# Patient Record
Sex: MALE | Race: WHITE | Employment: OTHER | URBAN - METROPOLITAN AREA
[De-identification: names, ages, dates, MRNs, and addresses within clinical notes are randomized per-mention and may not be internally consistent; named-entity substitution may affect disease eponyms.]

---

## 2017-09-22 PROBLEM — J44.9 COPD (CHRONIC OBSTRUCTIVE PULMONARY DISEASE) (HCC): Status: ACTIVE | Noted: 2017-09-22

## 2017-09-22 PROBLEM — I48.91 A-FIB (HCC): Status: ACTIVE | Noted: 2017-09-22

## 2017-09-22 PROBLEM — R07.9 CHEST PAIN: Status: ACTIVE | Noted: 2017-09-22

## 2017-09-22 PROBLEM — I10 HTN (HYPERTENSION): Status: ACTIVE | Noted: 2017-09-22

## 2019-11-08 ENCOUNTER — HOSPITAL ENCOUNTER (EMERGENCY)
Age: 59
Discharge: HOME | End: 2019-11-08
Attending: EMERGENCY MEDICINE
Payer: COMMERCIAL

## 2019-11-08 ENCOUNTER — APPOINTMENT (OUTPATIENT)
Dept: CT IMAGING | Age: 59
End: 2019-11-08
Payer: COMMERCIAL

## 2019-11-08 ENCOUNTER — APPOINTMENT (OUTPATIENT)
Dept: GENERAL RADIOLOGY | Age: 59
End: 2019-11-08
Payer: COMMERCIAL

## 2019-11-08 VITALS
HEART RATE: 77 BPM | SYSTOLIC BLOOD PRESSURE: 125 MMHG | OXYGEN SATURATION: 94 % | WEIGHT: 226 LBS | DIASTOLIC BLOOD PRESSURE: 84 MMHG | BODY MASS INDEX: 30.65 KG/M2 | TEMPERATURE: 98.9 F | RESPIRATION RATE: 26 BRPM

## 2019-11-08 DIAGNOSIS — I95.1 ORTHOSTATIC HYPOTENSION: ICD-10-CM

## 2019-11-08 DIAGNOSIS — R55 SYNCOPE AND COLLAPSE: Primary | ICD-10-CM

## 2019-11-08 LAB
A/G RATIO: 1.3 (ref 1.1–2.2)
ALBUMIN SERPL-MCNC: 3.8 G/DL (ref 3.4–5)
ALP BLD-CCNC: 40 U/L (ref 40–129)
ALT SERPL-CCNC: 19 U/L (ref 10–40)
ANION GAP SERPL CALCULATED.3IONS-SCNC: 10 MMOL/L (ref 3–16)
AST SERPL-CCNC: 24 U/L (ref 15–37)
BASOPHILS ABSOLUTE: 0.1 K/UL (ref 0–0.2)
BASOPHILS RELATIVE PERCENT: 1.2 %
BILIRUB SERPL-MCNC: 0.6 MG/DL (ref 0–1)
BILIRUBIN URINE: NEGATIVE
BLOOD, URINE: NEGATIVE
BUN BLDV-MCNC: 15 MG/DL (ref 7–20)
CALCIUM SERPL-MCNC: 9.2 MG/DL (ref 8.3–10.6)
CHLORIDE BLD-SCNC: 102 MMOL/L (ref 99–110)
CLARITY: CLEAR
CO2: 21 MMOL/L (ref 21–32)
COLOR: YELLOW
CREAT SERPL-MCNC: 0.7 MG/DL (ref 0.8–1.3)
EOSINOPHILS ABSOLUTE: 0.1 K/UL (ref 0–0.6)
EOSINOPHILS RELATIVE PERCENT: 0.6 %
GFR AFRICAN AMERICAN: >60
GFR NON-AFRICAN AMERICAN: >60
GLOBULIN: 2.9 G/DL
GLUCOSE BLD-MCNC: 121 MG/DL (ref 70–99)
GLUCOSE URINE: NEGATIVE MG/DL
HCT VFR BLD CALC: 43.3 % (ref 40.5–52.5)
HEMOGLOBIN: 14.7 G/DL (ref 13.5–17.5)
KETONES, URINE: NEGATIVE MG/DL
LEUKOCYTE ESTERASE, URINE: NEGATIVE
LYMPHOCYTES ABSOLUTE: 3.3 K/UL (ref 1–5.1)
LYMPHOCYTES RELATIVE PERCENT: 32.2 %
MCH RBC QN AUTO: 26.8 PG (ref 26–34)
MCHC RBC AUTO-ENTMCNC: 33.8 G/DL (ref 31–36)
MCV RBC AUTO: 79.2 FL (ref 80–100)
MICROSCOPIC EXAMINATION: NORMAL
MONOCYTES ABSOLUTE: 0.8 K/UL (ref 0–1.3)
MONOCYTES RELATIVE PERCENT: 8 %
NEUTROPHILS ABSOLUTE: 5.9 K/UL (ref 1.7–7.7)
NEUTROPHILS RELATIVE PERCENT: 58 %
NITRITE, URINE: NEGATIVE
PDW BLD-RTO: 15.1 % (ref 12.4–15.4)
PH UA: 6 (ref 5–8)
PLATELET # BLD: 243 K/UL (ref 135–450)
PMV BLD AUTO: 7.7 FL (ref 5–10.5)
POTASSIUM SERPL-SCNC: 4.5 MMOL/L (ref 3.5–5.1)
PROTEIN UA: NEGATIVE MG/DL
RBC # BLD: 5.47 M/UL (ref 4.2–5.9)
SODIUM BLD-SCNC: 133 MMOL/L (ref 136–145)
SPECIFIC GRAVITY UA: 1.02 (ref 1–1.03)
TOTAL PROTEIN: 6.7 G/DL (ref 6.4–8.2)
TROPONIN: <0.01 NG/ML
URINE REFLEX TO CULTURE: NORMAL
URINE TYPE: NORMAL
UROBILINOGEN, URINE: 0.2 E.U./DL
WBC # BLD: 10.1 K/UL (ref 4–11)

## 2019-11-08 PROCEDURE — 71046 X-RAY EXAM CHEST 2 VIEWS: CPT

## 2019-11-08 PROCEDURE — 85025 COMPLETE CBC W/AUTO DIFF WBC: CPT

## 2019-11-08 PROCEDURE — 99284 EMERGENCY DEPT VISIT MOD MDM: CPT

## 2019-11-08 PROCEDURE — 70450 CT HEAD/BRAIN W/O DYE: CPT

## 2019-11-08 PROCEDURE — 93005 ELECTROCARDIOGRAM TRACING: CPT | Performed by: EMERGENCY MEDICINE

## 2019-11-08 PROCEDURE — 81003 URINALYSIS AUTO W/O SCOPE: CPT

## 2019-11-08 PROCEDURE — 2580000003 HC RX 258: Performed by: EMERGENCY MEDICINE

## 2019-11-08 PROCEDURE — 80053 COMPREHEN METABOLIC PANEL: CPT

## 2019-11-08 PROCEDURE — 84484 ASSAY OF TROPONIN QUANT: CPT

## 2019-11-08 PROCEDURE — 96360 HYDRATION IV INFUSION INIT: CPT

## 2019-11-08 PROCEDURE — 96361 HYDRATE IV INFUSION ADD-ON: CPT

## 2019-11-08 RX ORDER — 0.9 % SODIUM CHLORIDE 0.9 %
1000 INTRAVENOUS SOLUTION INTRAVENOUS ONCE
Status: COMPLETED | OUTPATIENT
Start: 2019-11-08 | End: 2019-11-08

## 2019-11-08 RX ORDER — ASPIRIN 81 MG/1
81 TABLET ORAL
COMMUNITY
Start: 2018-06-12

## 2019-11-08 RX ADMIN — SODIUM CHLORIDE 1000 ML: 9 INJECTION, SOLUTION INTRAVENOUS at 21:38

## 2019-11-08 RX ADMIN — SODIUM CHLORIDE 1000 ML: 9 INJECTION, SOLUTION INTRAVENOUS at 20:36

## 2019-11-08 ASSESSMENT — ENCOUNTER SYMPTOMS
VOMITING: 0
DIARRHEA: 0
NAUSEA: 0
CONSTIPATION: 0
SORE THROAT: 0
COUGH: 0
ABDOMINAL PAIN: 0
BACK PAIN: 0
SHORTNESS OF BREATH: 0

## 2019-11-08 ASSESSMENT — PAIN SCALES - GENERAL
PAINLEVEL_OUTOF10: 7
PAINLEVEL_OUTOF10: 0

## 2019-11-08 ASSESSMENT — PAIN DESCRIPTION - LOCATION: LOCATION: BACK

## 2019-11-08 ASSESSMENT — PAIN DESCRIPTION - ORIENTATION: ORIENTATION: LEFT;LOWER

## 2019-11-09 LAB
EKG ATRIAL RATE: 258 BPM
EKG DIAGNOSIS: NORMAL
EKG P AXIS: 35 DEGREES
EKG Q-T INTERVAL: 348 MS
EKG QRS DURATION: 92 MS
EKG QTC CALCULATION (BAZETT): 383 MS
EKG R AXIS: -8 DEGREES
EKG T AXIS: 26 DEGREES
EKG VENTRICULAR RATE: 73 BPM

## 2019-11-09 PROCEDURE — 93010 ELECTROCARDIOGRAM REPORT: CPT | Performed by: INTERNAL MEDICINE

## 2020-03-24 ENCOUNTER — HOSPITAL ENCOUNTER (EMERGENCY)
Age: 60
Discharge: HOME | End: 2020-03-24
Payer: COMMERCIAL

## 2020-03-24 VITALS
BODY MASS INDEX: 30.07 KG/M2 | SYSTOLIC BLOOD PRESSURE: 128 MMHG | OXYGEN SATURATION: 95 % | WEIGHT: 222 LBS | DIASTOLIC BLOOD PRESSURE: 82 MMHG | HEART RATE: 84 BPM | RESPIRATION RATE: 20 BRPM | HEIGHT: 72 IN | TEMPERATURE: 98 F

## 2020-03-24 PROCEDURE — 99282 EMERGENCY DEPT VISIT SF MDM: CPT

## 2020-03-24 ASSESSMENT — ENCOUNTER SYMPTOMS
STRIDOR: 0
ABDOMINAL PAIN: 0
NAUSEA: 1
CHEST TIGHTNESS: 0
SORE THROAT: 1
VOMITING: 1
COLOR CHANGE: 0
SHORTNESS OF BREATH: 1
EYES NEGATIVE: 1
COUGH: 1

## 2020-03-25 ENCOUNTER — CARE COORDINATION (OUTPATIENT)
Dept: CARE COORDINATION | Age: 60
End: 2020-03-25

## 2020-03-25 NOTE — ED NOTES
Discharge instructions reviewed with pt and pt verbalized understanding. Pt discharged with no LDA, ambulatory and stable upon discharge to home.      Hawa Woodruff RN  03/24/20 7502

## 2020-03-25 NOTE — ED PROVIDER NOTES
Strength is 5/5 in all extremities and sensation is intact. Normal gait. PSYCHIATRIC: Normal affect        DIAGNOSTICRESULTS:     None      PROCEDURES:   N/A    CRITICAL CARE TIME:     None      CONSULTS:  None      EMERGENCY DEPARTMENT COURSE and DIFFERENTIAL DIAGNOSIS/MDM:   Vitals:    Vitals:    03/24/20 1911 03/24/20 2040 03/24/20 2104   BP: 128/82     Pulse: 87 84    Resp: 20     Temp: 98 °F (36.7 °C)  98 °F (36.7 °C)   TempSrc: Oral  Oral   SpO2: 95% 95%    Weight: 222 lb (100.7 kg)     Height: 6' (1.829 m)         Patient was given the following medications:  None    I have evaluated this patient in the ED. Old records were reviewed. Patient is here describing what sounds like a viral syndrome. He has not had a fever but describes upper and lower respiratory symptoms. He had some GI upset over the weekend but not in the last 3 days. He has normal vital signs in the ED. He is not febrile, tachycardic, tachypneic or hypoxic. I was able to review the patient's chest x-ray done on 3/10. Nothing is changed since per patient. He reports a negative flu and strep screen. I spoke with the patient at length. We talked about symptoms that should prompt him to return back to the ED including fevers, worsening shortness of breath or lethargy. Patient in agreement with plan of care and is agreeable to go home and follow-up. All questions answered prior to discharge. I estimate there is LOW risk for ACS, SEPSIS, STREP, EPIGLOTTITIS, PNEUMONIA, INFLUENZA, MENINGITIS, OR URINARY TRACT INFECTION, thus I consider the discharge disposition reasonable. Also, there is no evidence or peritonitis, sepsis, or toxicity. Mary Castaneda and I have discussed the diagnosis and risks, and we agree with discharging home to follow-up with their primary doctor. We also discussed returning to the Emergency Department immediately if new or worsening symptoms occur.  We have discussed the symptoms which are most concerning

## 2020-03-25 NOTE — CARE COORDINATION
Ambulatory Care Coordination  ED Follow up Call    Reason for ED visit:  Flu like symptoms, malaise  Status:     not changed     Did you receive a discharge instructions from the Emergency Room? Yes  Review of Instructions:     Understands what to report/when to return?:  Yes   Understands discharge instructions?:  Yes   Following discharge instructions?:  Yes       If you have a wound is the dressing clean, dry, and intact? N/A  Understands wound care regimen? N/A    FU appts/Provider:    No future appointments. New Medications?:   No          Understands Medications? Yes  Taking Medications? Yes    Link to services in community?:  N/A        COVID-19 Screening Initial Follow-up Note    Patient contacted regarding Lakia Haider. Care Transition Nurse/ Ambulatory Care Manager contacted the patient by telephone to perform post discharge assessment. Verified name and  with patient as identifiers. Provided introduction to self, and explanation of the CTN/ACM role, and reason for call due to risk factors for infection and/or exposure to COVID-19. Symptoms reviewed with patient who verbalized the following symptoms: fatigue, pain or aching joints, cough, shortness of breath, dizziness/lightheadedness, low body temperature and no new/worsening symptoms       Due to no new or worsening symptoms encounter was not routed to provider for escalation. Patient has following risk factors of: COPD. CTN/ACM reviewed discharge instructions, medical action plan and red flags such as increased shortness of breath, increasing fever and signs of decompensation with patient who verbalized understanding. Discussed exposure protocols and quarantine with CDC Guidelines What to do if you are sick with coronavirus disease 2019 Patient who was given an opportunity for questions and concerns.  The patient agrees to contact the [facility] exposure line [phone], [facility] department [place] [facility]:

## 2020-04-10 ENCOUNTER — CARE COORDINATION (OUTPATIENT)
Dept: CARE COORDINATION | Age: 60
End: 2020-04-10

## 2021-05-09 ENCOUNTER — HOSPITAL ENCOUNTER (EMERGENCY)
Age: 61
Discharge: HOME | End: 2021-05-09
Attending: EMERGENCY MEDICINE
Payer: COMMERCIAL

## 2021-05-09 VITALS
WEIGHT: 212 LBS | SYSTOLIC BLOOD PRESSURE: 119 MMHG | DIASTOLIC BLOOD PRESSURE: 86 MMHG | BODY MASS INDEX: 28.71 KG/M2 | HEART RATE: 63 BPM | TEMPERATURE: 97.6 F | RESPIRATION RATE: 12 BRPM | OXYGEN SATURATION: 94 % | HEIGHT: 72 IN

## 2021-05-09 DIAGNOSIS — R55 SYNCOPE AND COLLAPSE: Primary | ICD-10-CM

## 2021-05-09 LAB
A/G RATIO: 1.4 (ref 1.1–2.2)
ALBUMIN SERPL-MCNC: 3.9 G/DL (ref 3.4–5)
ALP BLD-CCNC: 39 U/L (ref 40–129)
ALT SERPL-CCNC: 22 U/L (ref 10–40)
ANION GAP SERPL CALCULATED.3IONS-SCNC: 10 MMOL/L (ref 3–16)
AST SERPL-CCNC: 19 U/L (ref 15–37)
BASOPHILS ABSOLUTE: 0.1 K/UL (ref 0–0.2)
BASOPHILS RELATIVE PERCENT: 1 %
BILIRUB SERPL-MCNC: 0.3 MG/DL (ref 0–1)
BILIRUBIN URINE: NEGATIVE
BLOOD, URINE: NEGATIVE
BUN BLDV-MCNC: 17 MG/DL (ref 7–20)
CALCIUM SERPL-MCNC: 9.4 MG/DL (ref 8.3–10.6)
CHLORIDE BLD-SCNC: 104 MMOL/L (ref 99–110)
CLARITY: CLEAR
CO2: 23 MMOL/L (ref 21–32)
COLOR: YELLOW
CREAT SERPL-MCNC: 1.1 MG/DL (ref 0.8–1.3)
EKG ATRIAL RATE: 67 BPM
EKG DIAGNOSIS: NORMAL
EKG P AXIS: 31 DEGREES
EKG P-R INTERVAL: 146 MS
EKG Q-T INTERVAL: 408 MS
EKG QRS DURATION: 96 MS
EKG QTC CALCULATION (BAZETT): 431 MS
EKG R AXIS: -8 DEGREES
EKG T AXIS: 24 DEGREES
EKG VENTRICULAR RATE: 67 BPM
EOSINOPHILS ABSOLUTE: 0.1 K/UL (ref 0–0.6)
EOSINOPHILS RELATIVE PERCENT: 0.9 %
GFR AFRICAN AMERICAN: >60
GFR NON-AFRICAN AMERICAN: >60
GLOBULIN: 2.8 G/DL
GLUCOSE BLD-MCNC: 123 MG/DL (ref 70–99)
GLUCOSE URINE: >=1000 MG/DL
HCT VFR BLD CALC: 44.8 % (ref 40.5–52.5)
HEMOGLOBIN: 14.9 G/DL (ref 13.5–17.5)
KETONES, URINE: NEGATIVE MG/DL
LEUKOCYTE ESTERASE, URINE: NEGATIVE
LYMPHOCYTES ABSOLUTE: 3.4 K/UL (ref 1–5.1)
LYMPHOCYTES RELATIVE PERCENT: 36.3 %
MCH RBC QN AUTO: 26.2 PG (ref 26–34)
MCHC RBC AUTO-ENTMCNC: 33.4 G/DL (ref 31–36)
MCV RBC AUTO: 78.6 FL (ref 80–100)
MICROSCOPIC EXAMINATION: ABNORMAL
MONOCYTES ABSOLUTE: 0.7 K/UL (ref 0–1.3)
MONOCYTES RELATIVE PERCENT: 7.3 %
NEUTROPHILS ABSOLUTE: 5.2 K/UL (ref 1.7–7.7)
NEUTROPHILS RELATIVE PERCENT: 54.5 %
NITRITE, URINE: NEGATIVE
PDW BLD-RTO: 15.4 % (ref 12.4–15.4)
PH UA: 6.5 (ref 5–8)
PLATELET # BLD: 247 K/UL (ref 135–450)
PMV BLD AUTO: 7.8 FL (ref 5–10.5)
POTASSIUM SERPL-SCNC: 4 MMOL/L (ref 3.5–5.1)
PROTEIN UA: NEGATIVE MG/DL
RBC # BLD: 5.7 M/UL (ref 4.2–5.9)
SODIUM BLD-SCNC: 137 MMOL/L (ref 136–145)
SPECIFIC GRAVITY UA: 1.02 (ref 1–1.03)
TOTAL PROTEIN: 6.7 G/DL (ref 6.4–8.2)
URINE TYPE: ABNORMAL
UROBILINOGEN, URINE: 0.2 E.U./DL
WBC # BLD: 9.5 K/UL (ref 4–11)

## 2021-05-09 PROCEDURE — 93005 ELECTROCARDIOGRAM TRACING: CPT | Performed by: EMERGENCY MEDICINE

## 2021-05-09 PROCEDURE — 99285 EMERGENCY DEPT VISIT HI MDM: CPT

## 2021-05-09 PROCEDURE — 93010 ELECTROCARDIOGRAM REPORT: CPT | Performed by: INTERNAL MEDICINE

## 2021-05-09 PROCEDURE — 80053 COMPREHEN METABOLIC PANEL: CPT

## 2021-05-09 PROCEDURE — 85025 COMPLETE CBC W/AUTO DIFF WBC: CPT

## 2021-05-09 PROCEDURE — 81003 URINALYSIS AUTO W/O SCOPE: CPT

## 2021-05-09 ASSESSMENT — PAIN DESCRIPTION - PAIN TYPE: TYPE: ACUTE PAIN

## 2021-05-09 ASSESSMENT — PAIN SCALES - GENERAL: PAINLEVEL_OUTOF10: 7

## 2021-05-09 NOTE — ED PROVIDER NOTES
[facility] Emergency Department    CHIEF COMPLAINT  Loss of Consciousness (seen at [facility] for neruology for sycope episodes, passed out mulitiple times at home today didnt hit head, )      SHARED SERVICE VISIT  I have seen and evaluated this patient with my supervising physician, Dr. Venkat Sow. HISTORY OF PRESENT ILLNESS  Lynette Gillespie is a 57 y.o. male who presents to the ED complaining of several episodes of syncope. He states he had several syncopal episodes last night where he was getting in or out of his bed and did hit his head on the carpet. He states he the patient states he is currently being seen by neurology at [facility] and has been diagnosed with autonomic dysfunction. He has been seen by cardiology to rule out significant arrhythmia or valvular pathology. Has previously had EEG without consistent seizure or epileptiform activity. He did recently have a tilt table test which was suggestive of underlying autonomic dysfunction. No other complaints, modifying factors or associated symptoms. Nursing notes reviewed.    Past Medical History:   Diagnosis Date    Arthritis     Atrial fibrillation ([address] 2003, 2010    Emphysema of lung ([address]     Esophageal reflux     Hyperlipidemia     Sleep apnea     Wears glasses      Past Surgical History:   Procedure Laterality Date    ABLATION OF DYSRHYTHMIC FOCUS      CARDIOVASCULAR STRESS TEST  7/11    negative result with myoview    CARDIOVERSION      X2 2003 AND 2010    CATARACT REMOVAL  10/05/11    left eye    CATARACT REMOVAL WITH IMPLANT  10/2011    right    KNEE ARTHROSCOPY  2009    both    SHOULDER SURGERY      right     Family History   Problem Relation Age of Onset    Diabetes Mother     Heart Disease Mother     Heart Disease Father     High Blood Pressure Sister     Diabetes Sister     Heart Disease Maternal Aunt     Cancer Paternal Uncle      Social History     Socioeconomic History    Marital status:  Spouse name: Not on file    Number of children: Not on file    Years of education: Not on file    Highest education level: Not on file   Occupational History    Not on file   Social Needs    Financial resource strain: Not on file    Food insecurity     Worry: Not on file     Inability: Not on file    Transportation needs     Medical: Not on file     Non-medical: Not on file   Tobacco Use    Smoking status: Current Every Day Smoker     Packs/day: 2.00     Years: 34.00     Pack years: 68.00     Types: Cigarettes    Smokeless tobacco: Never Used   Substance and Sexual Activity    Alcohol use: Yes     Alcohol/week: 2.0 standard drinks     Types: 2 Cans of beer per week     Comment: 1 x week    Drug use: No    Sexual activity: Not on file   Lifestyle    Physical activity     Days per week: Not on file     Minutes per session: Not on file    Stress: Not on file   Relationships    Social connections     Talks on phone: Not on file     Gets together: Not on file     Attends [employer] service: Not on file     Active member of club or organization: Not on file     Attends meetings of clubs or organizations: Not on file     Relationship status: Not on file    Intimate partner violence     Fear of current or ex partner: Not on file     Emotionally abused: Not on file     Physically abused: Not on file     Forced sexual activity: Not on file   Other Topics Concern    Not on file   Social History Narrative    Not on file     No current facility-administered medications for this encounter. Current Outpatient Medications   Medication Sig Dispense Refill    aspirin 81 MG EC tablet Take 81 mg by mouth      atorvastatin (LIPITOR) 40 MG tablet Take 40 mg by mouth daily      amLODIPine (NORVASC) 5 MG tablet Take 5 mg by mouth daily      ALBUTEROL IN Inhale  into the lungs.        Allergies   Allergen Reactions    Bee Venom Shortness Of Breath and Swelling     Wasp / bee stings      Codeine Hives       REVIEW OF SYSTEMS  10 systems reviewed, pertinent positives per HPI otherwise noted to be negative    PHYSICAL EXAM  /79   Pulse 65   Temp 97.6 °F (36.4 °C)   Resp 18   Ht 6' (1.829 m)   Wt 212 lb (96.2 kg)   SpO2 95%   BMI 28.75 kg/m²   GENERAL APPEARANCE: Awake and alert. Cooperative. No acute distress. HEAD: Normocephalic. Atraumatic. EYES: PERRL. EOM's grossly intact. ENT: Mucous membranes are moist.   NECK: Supple. HEART: RRR. No murmurs. LUNGS: Respirations unlabored. CTAB. Good air exchange. Speaking comfortably in full sentences. ABDOMEN: Soft. Non-distended. Non-tender. No guarding or rebound. No masses. No organomegaly. EXTREMITIES: No peripheral edema. Moves all extremities equally. All extremities neurovascularly intact. SKIN: Warm and dry. No acute rashes. NEUROLOGICAL: Alert and oriented. CN's 2-12 intact. No gross facial drooping. Strength 5/5, sensation intact. PSYCHIATRIC: Normal mood and affect. RADIOLOGY  No results found. ED COURSE  Patient presented with concerns of multiple syncopal episodes that are chronic, but have been worse in the last 24 hours. NIHSS is 0, GSC 15.  Physical exam is completely benign, triage vitals show evidence of mild hypotension. Orthostatics are notable for increased heart rate, but negative. CBC, CMP within normal limits. Urinalysis does show elevated glucose, patient to follow-up with PCP for further evaluation of this finding. EKG is interpreted by attending physician and does show normal sinus rhythm. [place] CT head Rule, Brooke syncope risk score negative therefore no imaging was obtained. We did obtain a report from [facility] as the patient has a loop recorder in place with hx of a fib. I spoke with a representative who informed me that the battery is full, there have been no recorded events of arrhythmia or high heart rate, and no self-reported events by the patient.   Upon reevaluation the patient is stating he is ready to go home. We did recommend a D-dimer at this time, to determine if a CT of his chest would be appropriate. However the patient does not want further testing at this time. I do not think it is unreasonable to be discharged home with close follow-up with his neurologist and cardiologist, but a thorough discussion is had with the patient and his wife that we were not able to rule out a PE at this time. Risk management discussed and shared decision making had with patient and/or surrogate. All questions were answered. Patient will follow up with his neurologist and cardiologist for further evaluation/treatment. Patient will return to ED for new/worsening symptoms.       MDM  Results for orders placed or performed during the hospital encounter of 05/09/21   Comprehensive Metabolic Panel   Result Value Ref Range    Sodium 137 136 - 145 mmol/L    Potassium 4.0 3.5 - 5.1 mmol/L    Chloride 104 99 - 110 mmol/L    CO2 23 21 - 32 mmol/L    Anion Gap 10 3 - 16    Glucose 123 (H) 70 - 99 mg/dL    BUN 17 7 - 20 mg/dL    CREATININE 1.1 0.8 - 1.3 mg/dL    GFR Non-African American >60 >60    GFR African American >60 >60    Calcium 9.4 8.3 - 10.6 mg/dL    Total Protein 6.7 6.4 - 8.2 g/dL    Albumin 3.9 3.4 - 5.0 g/dL    Albumin/Globulin Ratio 1.4 1.1 - 2.2    Total Bilirubin 0.3 0.0 - 1.0 mg/dL    Alkaline Phosphatase 39 (L) 40 - 129 U/L    ALT 22 10 - 40 U/L    AST 19 15 - 37 U/L    Globulin 2.8 g/dL   CBC Auto Differential   Result Value Ref Range    WBC 9.5 4.0 - 11.0 K/uL    RBC 5.70 4.20 - 5.90 M/uL    Hemoglobin 14.9 13.5 - 17.5 g/dL    Hematocrit 44.8 40.5 - 52.5 %    MCV 78.6 (L) 80.0 - 100.0 fL    MCH 26.2 26.0 - 34.0 pg    MCHC 33.4 31.0 - 36.0 g/dL    RDW 15.4 12.4 - 15.4 %    Platelets 455 115 - 724 K/uL    MPV 7.8 5.0 - 10.5 fL    Neutrophils % 54.5 %    Lymphocytes % 36.3 %    Monocytes % 7.3 %    Eosinophils % 0.9 %    Basophils % 1.0 %    Neutrophils Absolute 5.2 1.7 - 7.7 K/uL    Lymphocytes Absolute 3.4 1.0 - 5.1 K/uL    Monocytes Absolute 0.7 0.0 - 1.3 K/uL    Eosinophils Absolute 0.1 0.0 - 0.6 K/uL    Basophils Absolute 0.1 0.0 - 0.2 K/uL   Urinalysis, reflex to microscopic   Result Value Ref Range    Color, UA Yellow Straw/Yellow    Clarity, UA Clear Clear    Glucose, Ur >=1000 (A) Negative mg/dL    Bilirubin Urine Negative Negative    Ketones, Urine Negative Negative mg/dL    Specific Gravity, UA 1.020 1.005 - 1.030    Blood, Urine Negative Negative    pH, UA 6.5 5.0 - 8.0    Protein, UA Negative Negative mg/dL    Urobilinogen, Urine 0.2 <2.0 E.U./dL    Nitrite, Urine Negative Negative    Leukocyte Esterase, Urine Negative Negative    Microscopic Examination Not Indicated     Urine Type NotGiven    EKG 12 Lead   Result Value Ref Range    Ventricular Rate 67 BPM    Atrial Rate 67 BPM    P-R Interval 146 ms    QRS Duration 96 ms    Q-T Interval 408 ms    QTc Calculation (Bazett) 431 ms    P Axis 31 degrees    R Axis -8 degrees    T Axis 24 degrees    Diagnosis       Normal sinus rhythmInferior infarct (cited on or before 09-MAY-2021)Cannot rule out Anterior infarct , age undeterminedAbnormal ECGWhen compared with ECG of 08-NOV-2019 19:15,No significant change was found       I estimate there is LOW risk for  ACUTE CORONARY SYNDROME, INTRACRANIAL HEMORRHAGE, MALIGNANT DYSRHYTHMIA or HYPERTENSION, PULMONARY EMBOLISM, SEPSIS, SUBARACHNOID HEMORRHAGE, SUBDURAL HEMATOMA, STROKE, or THORACIC AORTIC DISSECTION, thus I consider the discharge disposition reasonable. Jennifer Castaneda and I have discussed the diagnosis and risks, and we agree with discharging home to follow-up with their primary doctor. We also discussed returning to the Emergency Department immediately if new or worsening symptoms occur. We have discussed the symptoms which are most concerning (e.g., bloody sputum, fever, worsening pain or shortness of breath, vomiting, weakness) that necessitate immediate return. .empmdm  Final Impression    1.  Syncope and

## 2021-05-09 NOTE — ED NOTES
Bed: 12  Expected date:   Expected time:   Means of arrival:   Comments:   Samantha Bolivar RN  05/09/21 5922

## 2021-05-09 NOTE — ED PROVIDER NOTES
I independently examined and evaluated Dameon Castaneda. In brief, patient is a 69-year-old male presents to the emergency department for evaluation of syncopal episodes. Patient reports having history of A. fib status post ablation and has a loop recorder. He has a cardiologist and a neurologist who had been working him up for the ongoing syncopal episodes. Patient reports he did not want to come to the ED but was urged to come to the emergency department by his sons who are visiting today for Mother's Day. Wife is at bedside. Patient reports having a headache associated with these episodes. However, after receiving Tylenol that his wife gave him, reports the headache is pretty much gone. Says it is a 1/10 and barely noticible. Denies anticoagulants. Denies having chest pain or shortness of breath. Focused exam revealed clinically nontoxic appearing male with normotension, no tachycardia, and maintaining sat above 90 on room air. Abdomen was soft, nondistended, and nontender to palpation throughout. Loop recorder had no recorded arrhythmias. Electrolytes within normal limits. Hemoglobin normal. D dimer was ordered because patient reported having intermittent afib. However, patient declined the d dimer. Shared decision done with patient and wife. Given no afib on the loop recording, I have low suspicion for PE at this time. However, discussed PE as a differential and instructed to return to ED immediately if persistent or worsening symptoms. Patient had no further syncopal episodes in the emergency department. Patient discharged home with instructions to follow-up with his cardiologist and/or neurologist for further evaluation and treatment. He was discharged home with strict return precautions. The Ekg interpreted by me shows  normal sinus rhythm with a rate of 67  Axis is   Normal  QTc is  normal  Intervals and Durations are unremarkable. ST Segments: nonspecific changes.  Inferior infarct, age undetermined. No significant change from prior EKG dated 11/08/19    All diagnostic, treatment, and disposition decisions were made by myself in conjunction with the advanced practice provider. For all further details of the patient's emergency department visit, please see the advanced practice provider's documentation. Comment: Please note this report has been produced using speech recognition software and may contain errors related to that system including errors in grammar, punctuation, and spelling, as well as words and phrases that may be inappropriate. If there are any questions or concerns please feel free to contact the dictating provider for clarification.         Denise Collier MD  05/09/21 3653

## 2022-07-22 ENCOUNTER — HOSPITAL ENCOUNTER (OUTPATIENT)
Age: 62
Setting detail: OBSERVATION
Discharge: HOME | End: 2022-07-23
Attending: STUDENT IN AN ORGANIZED HEALTH CARE EDUCATION/TRAINING PROGRAM | Admitting: INTERNAL MEDICINE
Payer: COMMERCIAL

## 2022-07-22 ENCOUNTER — APPOINTMENT (OUTPATIENT)
Dept: GENERAL RADIOLOGY | Age: 62
End: 2022-07-22
Payer: COMMERCIAL

## 2022-07-22 DIAGNOSIS — Z72.0 TOBACCO USE: ICD-10-CM

## 2022-07-22 DIAGNOSIS — R07.9 CHEST PAIN, UNSPECIFIED TYPE: Primary | ICD-10-CM

## 2022-07-22 DIAGNOSIS — R06.00 DYSPNEA, UNSPECIFIED TYPE: ICD-10-CM

## 2022-07-22 LAB
A/G RATIO: 1.4 (ref 1.1–2.2)
ALBUMIN SERPL-MCNC: 4.1 G/DL (ref 3.4–5)
ALP BLD-CCNC: 45 U/L (ref 40–129)
ALT SERPL-CCNC: 16 U/L (ref 10–40)
ANION GAP SERPL CALCULATED.3IONS-SCNC: 11 MMOL/L (ref 3–16)
AST SERPL-CCNC: 14 U/L (ref 15–37)
BASOPHILS ABSOLUTE: 0.1 K/UL (ref 0–0.2)
BASOPHILS RELATIVE PERCENT: 0.9 %
BILIRUB SERPL-MCNC: 0.5 MG/DL (ref 0–1)
BUN BLDV-MCNC: 14 MG/DL (ref 7–20)
CALCIUM SERPL-MCNC: 9.5 MG/DL (ref 8.3–10.6)
CHLORIDE BLD-SCNC: 105 MMOL/L (ref 99–110)
CO2: 22 MMOL/L (ref 21–32)
CREAT SERPL-MCNC: 0.6 MG/DL (ref 0.8–1.3)
EKG ATRIAL RATE: 68 BPM
EKG DIAGNOSIS: NORMAL
EKG P AXIS: 48 DEGREES
EKG P-R INTERVAL: 156 MS
EKG Q-T INTERVAL: 390 MS
EKG QRS DURATION: 102 MS
EKG QTC CALCULATION (BAZETT): 414 MS
EKG R AXIS: -5 DEGREES
EKG T AXIS: 26 DEGREES
EKG VENTRICULAR RATE: 68 BPM
EOSINOPHILS ABSOLUTE: 0.1 K/UL (ref 0–0.6)
EOSINOPHILS RELATIVE PERCENT: 1.5 %
GFR AFRICAN AMERICAN: >60
GFR NON-AFRICAN AMERICAN: >60
GLUCOSE BLD-MCNC: 108 MG/DL (ref 70–99)
GLUCOSE BLD-MCNC: 137 MG/DL (ref 70–99)
GLUCOSE BLD-MCNC: 173 MG/DL (ref 70–99)
HCT VFR BLD CALC: 47.4 % (ref 40.5–52.5)
HEMOGLOBIN: 15.8 G/DL (ref 13.5–17.5)
LYMPHOCYTES ABSOLUTE: 3 K/UL (ref 1–5.1)
LYMPHOCYTES RELATIVE PERCENT: 31.6 %
MCH RBC QN AUTO: 26.5 PG (ref 26–34)
MCHC RBC AUTO-ENTMCNC: 33.4 G/DL (ref 31–36)
MCV RBC AUTO: 79.5 FL (ref 80–100)
MONOCYTES ABSOLUTE: 0.7 K/UL (ref 0–1.3)
MONOCYTES RELATIVE PERCENT: 7.7 %
NEUTROPHILS ABSOLUTE: 5.6 K/UL (ref 1.7–7.7)
NEUTROPHILS RELATIVE PERCENT: 58.3 %
PDW BLD-RTO: 15.7 % (ref 12.4–15.4)
PERFORMED ON: ABNORMAL
PERFORMED ON: ABNORMAL
PLATELET # BLD: 266 K/UL (ref 135–450)
PMV BLD AUTO: 7.3 FL (ref 5–10.5)
POTASSIUM REFLEX MAGNESIUM: 4.1 MMOL/L (ref 3.5–5.1)
PRO-BNP: 16 PG/ML (ref 0–124)
RBC # BLD: 5.96 M/UL (ref 4.2–5.9)
REASON FOR REJECTION: NORMAL
REJECTED TEST: NORMAL
SARS-COV-2, NAAT: NOT DETECTED
SODIUM BLD-SCNC: 138 MMOL/L (ref 136–145)
TOTAL PROTEIN: 7 G/DL (ref 6.4–8.2)
TROPONIN: <0.01 NG/ML
WBC # BLD: 9.5 K/UL (ref 4–11)

## 2022-07-22 PROCEDURE — 84484 ASSAY OF TROPONIN QUANT: CPT

## 2022-07-22 PROCEDURE — 93010 ELECTROCARDIOGRAM REPORT: CPT | Performed by: INTERNAL MEDICINE

## 2022-07-22 PROCEDURE — 6370000000 HC RX 637 (ALT 250 FOR IP): Performed by: INTERNAL MEDICINE

## 2022-07-22 PROCEDURE — 83880 ASSAY OF NATRIURETIC PEPTIDE: CPT

## 2022-07-22 PROCEDURE — 83036 HEMOGLOBIN GLYCOSYLATED A1C: CPT

## 2022-07-22 PROCEDURE — 87635 SARS-COV-2 COVID-19 AMP PRB: CPT

## 2022-07-22 PROCEDURE — 93005 ELECTROCARDIOGRAM TRACING: CPT | Performed by: STUDENT IN AN ORGANIZED HEALTH CARE EDUCATION/TRAINING PROGRAM

## 2022-07-22 PROCEDURE — G0378 HOSPITAL OBSERVATION PER HR: HCPCS

## 2022-07-22 PROCEDURE — 96375 TX/PRO/DX INJ NEW DRUG ADDON: CPT

## 2022-07-22 PROCEDURE — 6360000002 HC RX W HCPCS: Performed by: INTERNAL MEDICINE

## 2022-07-22 PROCEDURE — 96372 THER/PROPH/DIAG INJ SC/IM: CPT

## 2022-07-22 PROCEDURE — 6370000000 HC RX 637 (ALT 250 FOR IP): Performed by: NURSE PRACTITIONER

## 2022-07-22 PROCEDURE — 71045 X-RAY EXAM CHEST 1 VIEW: CPT

## 2022-07-22 PROCEDURE — 85025 COMPLETE CBC W/AUTO DIFF WBC: CPT

## 2022-07-22 PROCEDURE — 36415 COLL VENOUS BLD VENIPUNCTURE: CPT

## 2022-07-22 PROCEDURE — 6360000002 HC RX W HCPCS: Performed by: STUDENT IN AN ORGANIZED HEALTH CARE EDUCATION/TRAINING PROGRAM

## 2022-07-22 PROCEDURE — 96374 THER/PROPH/DIAG INJ IV PUSH: CPT

## 2022-07-22 PROCEDURE — 2580000003 HC RX 258: Performed by: INTERNAL MEDICINE

## 2022-07-22 PROCEDURE — 80053 COMPREHEN METABOLIC PANEL: CPT

## 2022-07-22 PROCEDURE — 99285 EMERGENCY DEPT VISIT HI MDM: CPT

## 2022-07-22 RX ORDER — ATORVASTATIN CALCIUM 40 MG/1
40 TABLET, FILM COATED ORAL NIGHTLY
Status: DISCONTINUED | OUTPATIENT
Start: 2022-07-22 | End: 2022-07-23 | Stop reason: HOSPADM

## 2022-07-22 RX ORDER — INSULIN LISPRO 100 [IU]/ML
0-4 INJECTION, SOLUTION INTRAVENOUS; SUBCUTANEOUS
Status: DISCONTINUED | OUTPATIENT
Start: 2022-07-22 | End: 2022-07-23 | Stop reason: HOSPADM

## 2022-07-22 RX ORDER — ONDANSETRON 2 MG/ML
4 INJECTION INTRAMUSCULAR; INTRAVENOUS EVERY 6 HOURS PRN
Status: DISCONTINUED | OUTPATIENT
Start: 2022-07-22 | End: 2022-07-23 | Stop reason: HOSPADM

## 2022-07-22 RX ORDER — SODIUM CHLORIDE 0.9 % (FLUSH) 0.9 %
5-40 SYRINGE (ML) INJECTION PRN
Status: DISCONTINUED | OUTPATIENT
Start: 2022-07-22 | End: 2022-07-23 | Stop reason: HOSPADM

## 2022-07-22 RX ORDER — NITROGLYCERIN 0.4 MG/1
0.4 TABLET SUBLINGUAL EVERY 5 MIN PRN
Status: DISCONTINUED | OUTPATIENT
Start: 2022-07-22 | End: 2022-07-23 | Stop reason: HOSPADM

## 2022-07-22 RX ORDER — DEXTROSE MONOHYDRATE 100 MG/ML
INJECTION, SOLUTION INTRAVENOUS CONTINUOUS PRN
Status: DISCONTINUED | OUTPATIENT
Start: 2022-07-22 | End: 2022-07-23 | Stop reason: HOSPADM

## 2022-07-22 RX ORDER — ASPIRIN 81 MG/1
81 TABLET ORAL DAILY
Status: DISCONTINUED | OUTPATIENT
Start: 2022-07-23 | End: 2022-07-23 | Stop reason: HOSPADM

## 2022-07-22 RX ORDER — AMLODIPINE BESYLATE 5 MG/1
5 TABLET ORAL NIGHTLY
Status: DISCONTINUED | OUTPATIENT
Start: 2022-07-22 | End: 2022-07-23 | Stop reason: HOSPADM

## 2022-07-22 RX ORDER — ONDANSETRON 4 MG/1
4 TABLET, ORALLY DISINTEGRATING ORAL EVERY 8 HOURS PRN
Status: DISCONTINUED | OUTPATIENT
Start: 2022-07-22 | End: 2022-07-23 | Stop reason: HOSPADM

## 2022-07-22 RX ORDER — EZETIMIBE 10 MG/1
10 TABLET ORAL NIGHTLY
Status: DISCONTINUED | OUTPATIENT
Start: 2022-07-22 | End: 2022-07-23 | Stop reason: HOSPADM

## 2022-07-22 RX ORDER — FENTANYL CITRATE 50 UG/ML
25 INJECTION, SOLUTION INTRAMUSCULAR; INTRAVENOUS ONCE
Status: COMPLETED | OUTPATIENT
Start: 2022-07-22 | End: 2022-07-22

## 2022-07-22 RX ORDER — INSULIN LISPRO 100 [IU]/ML
0-4 INJECTION, SOLUTION INTRAVENOUS; SUBCUTANEOUS NIGHTLY
Status: DISCONTINUED | OUTPATIENT
Start: 2022-07-22 | End: 2022-07-23 | Stop reason: HOSPADM

## 2022-07-22 RX ORDER — EZETIMIBE 10 MG/1
10 TABLET ORAL DAILY
COMMUNITY

## 2022-07-22 RX ORDER — SODIUM CHLORIDE 0.9 % (FLUSH) 0.9 %
5-40 SYRINGE (ML) INJECTION EVERY 12 HOURS SCHEDULED
Status: DISCONTINUED | OUTPATIENT
Start: 2022-07-22 | End: 2022-07-23 | Stop reason: HOSPADM

## 2022-07-22 RX ORDER — ACETAMINOPHEN 325 MG/1
650 TABLET ORAL EVERY 6 HOURS PRN
Status: DISCONTINUED | OUTPATIENT
Start: 2022-07-22 | End: 2022-07-23 | Stop reason: HOSPADM

## 2022-07-22 RX ORDER — POLYETHYLENE GLYCOL 3350 17 G/17G
17 POWDER, FOR SOLUTION ORAL DAILY PRN
Status: DISCONTINUED | OUTPATIENT
Start: 2022-07-22 | End: 2022-07-23 | Stop reason: HOSPADM

## 2022-07-22 RX ORDER — ENOXAPARIN SODIUM 100 MG/ML
40 INJECTION SUBCUTANEOUS EVERY 24 HOURS
Status: DISCONTINUED | OUTPATIENT
Start: 2022-07-22 | End: 2022-07-23 | Stop reason: HOSPADM

## 2022-07-22 RX ORDER — ACETAMINOPHEN 650 MG/1
650 SUPPOSITORY RECTAL EVERY 6 HOURS PRN
Status: DISCONTINUED | OUTPATIENT
Start: 2022-07-22 | End: 2022-07-23 | Stop reason: HOSPADM

## 2022-07-22 RX ORDER — ONDANSETRON 2 MG/ML
4 INJECTION INTRAMUSCULAR; INTRAVENOUS ONCE
Status: COMPLETED | OUTPATIENT
Start: 2022-07-22 | End: 2022-07-22

## 2022-07-22 RX ORDER — SODIUM CHLORIDE 9 MG/ML
INJECTION, SOLUTION INTRAVENOUS PRN
Status: DISCONTINUED | OUTPATIENT
Start: 2022-07-22 | End: 2022-07-23 | Stop reason: HOSPADM

## 2022-07-22 RX ADMIN — FENTANYL CITRATE 25 MCG: 50 INJECTION, SOLUTION INTRAMUSCULAR; INTRAVENOUS at 08:26

## 2022-07-22 RX ADMIN — ATORVASTATIN CALCIUM 40 MG: 40 TABLET, FILM COATED ORAL at 21:53

## 2022-07-22 RX ADMIN — Medication 10 ML: at 21:53

## 2022-07-22 RX ADMIN — EZETIMIBE 10 MG: 10 TABLET ORAL at 21:53

## 2022-07-22 RX ADMIN — AMLODIPINE BESYLATE 5 MG: 5 TABLET ORAL at 21:53

## 2022-07-22 RX ADMIN — ONDANSETRON 4 MG: 2 INJECTION INTRAMUSCULAR; INTRAVENOUS at 08:25

## 2022-07-22 RX ADMIN — ENOXAPARIN SODIUM 40 MG: 40 INJECTION SUBCUTANEOUS at 18:36

## 2022-07-22 ASSESSMENT — ENCOUNTER SYMPTOMS
VOMITING: 0
RHINORRHEA: 0
ABDOMINAL PAIN: 0
DIARRHEA: 0
CONSTIPATION: 0
SORE THROAT: 0
COUGH: 1
SHORTNESS OF BREATH: 1
NAUSEA: 0
COLOR CHANGE: 0
CHEST TIGHTNESS: 1

## 2022-07-22 ASSESSMENT — PAIN SCALES - GENERAL
PAINLEVEL_OUTOF10: 0
PAINLEVEL_OUTOF10: 3
PAINLEVEL_OUTOF10: 0

## 2022-07-22 ASSESSMENT — PAIN - FUNCTIONAL ASSESSMENT: PAIN_FUNCTIONAL_ASSESSMENT: 0-10

## 2022-07-22 NOTE — ED PROVIDER NOTES
[facility] Emergency Department       Date of evaluation: 7/22/2022    Chief Complaint     Chest Pain (CP ) and Shortness of Breath      History of Present Illness     Alin Gibson is a 60 y.o. male who presents with chest pain. He has history of hyperlipidemia, hypertension, atrial fibrillation, emphysema. He presents with chest pain for the past 2 days with progressive worsening. Chest pain is waxing and waning, radiates down left arm, worsens with exertion. He has associated shortness of breath. Chest pain is pleuritic as well. Reports cough but denies any fever. Denies any orthopnea or PND. Denies any nausea and vomiting. He received full dose aspirin as well as 1 dose of nitroglycerin with no relief. States that the nitroglycerin gave him a generalized headache. Chest pain is not positional.  Denies prior episodes. Patient reports prior stress test but its been a few years. Denies any significant weight gain or weight loss. He does have active tobacco use but is trying to cut down and is currently smoking 2 to 3 cigarettes/day. Review of Systems     Review of Systems   Constitutional:  Positive for fatigue. Negative for activity change, appetite change, chills and fever. HENT:  Positive for congestion. Negative for rhinorrhea and sore throat. Eyes:  Negative for visual disturbance. Respiratory:  Positive for cough, chest tightness and shortness of breath. Cardiovascular:  Positive for chest pain. Negative for palpitations and leg swelling. Gastrointestinal:  Negative for abdominal pain, constipation, diarrhea, nausea and vomiting. Endocrine: Negative for polydipsia and polyuria. Genitourinary:  Negative for dysuria, flank pain and hematuria. Musculoskeletal:  Negative for arthralgias and neck stiffness. Skin:  Negative for color change. Neurological:  Positive for headaches. Negative for dizziness and syncope. Psychiatric/Behavioral:  Negative for confusion. Past Medical, Surgical, Family, and Social History     He has a past medical history of Arthritis, Atrial fibrillation ([address] Emphysema of lung ([address] Esophageal reflux, Hyperlipidemia, Sleep apnea, and Wears glasses. He has a past surgical history that includes Knee arthroscopy (2009); cardiovascular stress test (7/11); Cardioversion; Cataract removal (10/05/11); Cataract removal with implant (10/2011); ablation of dysrhythmic focus; and shoulder surgery. His family history includes Cancer in his paternal uncle; Diabetes in his mother and sister; Heart Disease in his father, maternal aunt, and mother; High Blood Pressure in his sister. He reports that he has been smoking cigarettes. He has a 68.00 pack-year smoking history. He has never used smokeless tobacco. He reports current alcohol use of about 2.0 standard drinks per week. He reports that he does not use drugs. Medications     Previous Medications    ALBUTEROL IN    Inhale  into the lungs. AMLODIPINE (NORVASC) 5 MG TABLET    Take 5 mg by mouth daily    ASPIRIN 81 MG EC TABLET    Take 81 mg by mouth    ATORVASTATIN (LIPITOR) 40 MG TABLET    Take 40 mg by mouth daily    EMPAGLIFLOZIN (JARDIANCE PO)    Take by mouth    EZETIMIBE (ZETIA) 10 MG TABLET    Take 10 mg by mouth in the morning. Allergies     He is allergic to bee venom and codeine. Physical Exam     INITIAL VITALS: BP: 125/86, Temp: 97.8 °F (36.6 °C), Heart Rate: 71, Resp: 13, SpO2: 95 %   Physical Exam  Vitals and nursing note reviewed. Constitutional:       General: He is not in acute distress. Appearance: He is well-developed. HENT:      Head: Normocephalic and atraumatic. Mouth/Throat:      Mouth: Mucous membranes are moist.   Eyes:      Extraocular Movements: Extraocular movements intact. Cardiovascular:      Rate and Rhythm: Normal rate and regular rhythm. Heart sounds: No murmur heard.     No friction rub. No gallop. Pulmonary:      Effort: Pulmonary effort is normal. No tachypnea. Breath sounds: Normal breath sounds. No decreased breath sounds, rhonchi or rales. Chest:      Chest wall: No tenderness. Abdominal:      Palpations: Abdomen is soft. Tenderness: There is no abdominal tenderness. There is no guarding or rebound. Musculoskeletal:         General: Normal range of motion. Cervical back: Normal range of motion and neck supple. Right lower leg: No edema. Left lower leg: No edema. Skin:     General: Skin is warm. Neurological:      General: No focal deficit present. Mental Status: He is alert. Cranial Nerves: No cranial nerve deficit. Motor: No weakness. Psychiatric:         Mood and Affect: Mood normal.         Behavior: Behavior normal.       Diagnostic Results     EKG   I interpreted the EKG and note:  Normal sinus rhythm, ventricular rate of 68, normal QRS duration, normal QTC of 414 no ST changes concerning for ischemia.     RADIOLOGY:  XR CHEST PORTABLE   Final Result   Unremarkable portable exam             LABS:   Results for orders placed or performed during the hospital encounter of 07/22/22   SARS-CoV-2 NAAT (Rapid)    Specimen: Nasopharyngeal Swab   Result Value Ref Range    SARS-CoV-2, NAAT Not Detected Not Detected   CBC with Auto Differential   Result Value Ref Range    WBC 9.5 4.0 - 11.0 K/uL    RBC 5.96 (H) 4.20 - 5.90 M/uL    Hemoglobin 15.8 13.5 - 17.5 g/dL    Hematocrit 47.4 40.5 - 52.5 %    MCV 79.5 (L) 80.0 - 100.0 fL    MCH 26.5 26.0 - 34.0 pg    MCHC 33.4 31.0 - 36.0 g/dL    RDW 15.7 (H) 12.4 - 15.4 %    Platelets 506 804 - 657 K/uL    MPV 7.3 5.0 - 10.5 fL    Neutrophils % 58.3 %    Lymphocytes % 31.6 %    Monocytes % 7.7 %    Eosinophils % 1.5 %    Basophils % 0.9 %    Neutrophils Absolute 5.6 1.7 - 7.7 K/uL    Lymphocytes Absolute 3.0 1.0 - 5.1 K/uL    Monocytes Absolute 0.7 0.0 - 1.3 K/uL    Eosinophils Absolute 0.1 0.0 - 0.6 K/uL    Basophils Absolute 0.1 0.0 - 0.2 K/uL   SPECIMEN REJECTION   Result Value Ref Range    Rejected Test cmpxtropbnp     Reason for Rejection see below    Comprehensive Metabolic Panel w/ Reflex to MG   Result Value Ref Range    Sodium 138 136 - 145 mmol/L    Potassium reflex Magnesium 4.1 3.5 - 5.1 mmol/L    Chloride 105 99 - 110 mmol/L    CO2 22 21 - 32 mmol/L    Anion Gap 11 3 - 16    Glucose 108 (H) 70 - 99 mg/dL    BUN 14 7 - 20 mg/dL    Creatinine 0.6 (L) 0.8 - 1.3 mg/dL    GFR Non-African American >60 >60    GFR African American >60 >60    Calcium 9.5 8.3 - 10.6 mg/dL    Total Protein 7.0 6.4 - 8.2 g/dL    Albumin 4.1 3.4 - 5.0 g/dL    Albumin/Globulin Ratio 1.4 1.1 - 2.2    Total Bilirubin 0.5 0.0 - 1.0 mg/dL    Alkaline Phosphatase 45 40 - 129 U/L    ALT 16 10 - 40 U/L    AST 14 (L) 15 - 37 U/L   Troponin   Result Value Ref Range    Troponin <0.01 <0.01 ng/mL   Brain Natriuretic Peptide   Result Value Ref Range    Pro-BNP 16 0 - 124 pg/mL   EKG 12 Lead   Result Value Ref Range    Ventricular Rate 68 BPM    Atrial Rate 68 BPM    P-R Interval 156 ms    QRS Duration 102 ms    Q-T Interval 390 ms    QTc Calculation (Bazett) 414 ms    P Axis 48 degrees    R Axis -5 degrees    T Axis 26 degrees    Diagnosis       Normal sinus rhythmPossible Inferior infarct (cited on or before 09-MAY-2021)Abnormal ECGWhen compared with ECG of 09-MAY-2021 15:13,Nonspecific T wave abnormality, improved in Inferior leadsT wave amplitude has increased in Anterior leads       ED BEDSIDE ULTRASOUND:  No results found. RECENT VITALS:  BP: 110/84,Temp: 97.8 °F (36.6 °C), Heart Rate: 60, Resp: 16, SpO2: 95 %     Procedures     NA    ED Course     Nursing Notes, Past Medical Hx, Past Surgical Hx, Social Hx,Allergies, and Family Hx were reviewed.          patient was given the following medications:  Orders Placed This Encounter   Medications    fentaNYL (SUBLIMAZE) injection 25 mcg    ondansetron (ZOFRAN) injection 4 mg CONSULTS:  None    MEDICAL DECISIONMAKING / ASSESSMENT / PLAN     Gene Henao is a 60 y.o. male who presents with chest pain and shortness of breath. Patient presented with normal vitals. He was normotensive, afebrile, normal heart rate of 68, normal respiratory rate of 13, satting at 93% on room air. He had no focal exam findings. Given history and exam my differential diagnosis includes but is not limited to ACS, pneumonia, COVID, costochondritis, acid reflux, CHF, COPD exacerbation. Presentation less consistent with PE. Also doubt underlying aortic dissection given hemodynamically stability, equal upper and lower extremity pulses bilaterally, and no neurologic deficits. I obtained labs and imaging studies as noted below    I interpreted the labs and note  CBC: Normal white blood cell count, normal hemoglobin at 15.8, normal hematocrit at 47.4, normal platelet count  Chemistry: Normal electrolytes, hyperglycemic to 123, normal creatinine at 1.1 and BUN at 17, decreased alk phos to 39 but LFTs and T bili within normal limits  Troponin negative at less than 0.01  COVID-negative    I interpreted the chest x-ray and note: No acute cardiopulmonary change    Unclear etiology of symptoms. Patient's heart score is 5 placing him at moderate risk. Reassuring that EKG did not show any new ischemic changes. Furthermore initial troponin was negative. He has no other significant laboratory findings. Given this we felt patient warranted admission for further work-up of his chest pain. Patient is amenable with plan. I spoke with hospitalist who accepted patient to their service. Clinical Impression     1. Chest pain, unspecified type    2. Dyspnea, unspecified type    3. Tobacco use        Disposition     PATIENT REFERRED TO:  No follow-up provider specified.     DISCHARGE MEDICATIONS:  New Prescriptions    No medications on file       DISPOSITION Admitted 07/22/2022 10:55:42 AM         Jose A Harvey Giovana Pina MD  07/22/22 1053

## 2022-07-22 NOTE — PROGRESS NOTES
4 Eyes Skin Assessment     The patient is being assess for   Admission    I agree that 2 RN's have performed a thorough Head to Toe Skin Assessment on the patient. ALL assessment sites listed below have been assessed. Areas assessed by both nurses:   [x]   Head, Face, and Ears   [x]   Shoulders, Back, and Chest, Abdomen  [x]   Arms, Elbows, and Hands   [x]   Coccyx, Sacrum, and Ischium  [x]   Legs, Feet, and Heels        \    **SHARE this note so that the co-signing nurse is able to place an eSignature**    Co-signer eSignature: Electronically signed by Bianca Lion RN on 7/22/22 at 7:31 PM EDT    Does the Patient have Skin Breakdown?   No          Vinod Prevention initiated:  No   Wound Care Orders initiated:  No      [facility] nurse consulted for Pressure Injury (Stage 3,4, Unstageable, DTI, NWPT, Complex wounds)and New or Established Ostomies:  No      Primary Nurse eSignature: Electronically signed by Klaudia Marion RN on 7/22/22 at 7:33 PM EDT

## 2022-07-22 NOTE — H&P
Hospital Medicine History & Physical      PCP: Clemencia Alexander MD    Date of Admission: 7/22/2022    Date of Service: Pt seen/examined on 22 July and placed in OBServation. Chief Complaint:  Chest Pain       History Of Present Illness:        60 y.o. male w/ hx HTN/Lipids/Afib s/p ablation 2016 at [facility] but w/out hx of known CAD who presented to [facility] with a 2 day hx of acute onset moderately severe exertional and non-exertional substernal Chest Pain w/ associated typical cardiac features including SOB/TOSCANO and radiation to L arm. Denies N/V/Diaphoresis. The patient denies any fever/chills or other signs/sxs of systemic illness or identifiable aggravating/alleviating factors. Past Medical History:          Diagnosis Date    Arthritis     Atrial fibrillation ([address] 2003, 2010    Emphysema of lung ([address]     Esophageal reflux     Hyperlipidemia     Sleep apnea     Wears glasses        Past Surgical History:          Procedure Laterality Date    ABLATION OF DYSRHYTHMIC FOCUS      CARDIOVASCULAR STRESS TEST  7/11    negative result with myoview    CARDIOVERSION      X2 2003 AND 2010    CATARACT REMOVAL  10/05/11    left eye    CATARACT REMOVAL WITH IMPLANT  10/2011    right    KNEE ARTHROSCOPY  2009    both    SHOULDER SURGERY      right       Medications Prior to Admission:      Prior to Admission medications    Medication Sig Start Date End Date Taking? Authorizing Provider   Empagliflozin (JARDIANCE PO) Take by mouth   Yes Historical Provider, MD   ezetimibe (ZETIA) 10 MG tablet Take 10 mg by mouth in the morning. Yes Historical Provider, MD   aspirin 81 MG EC tablet Take 81 mg by mouth 6/12/18  Yes Historical Provider, MD   atorvastatin (LIPITOR) 40 MG tablet Take 40 mg by mouth daily   Yes Historical Provider, MD   amLODIPine (NORVASC) 5 MG tablet Take 5 mg by mouth daily   Yes Historical Provider, MD   ALBUTEROL IN Inhale  into the lungs.    Yes Historical Provider, MD       Allergies:  Bee venom and Codeine    Social History:      The patient currently lives independently    TOBACCO:   reports that he has been smoking cigarettes. He has a 68.00 pack-year smoking history. He has never used smokeless tobacco.  ETOH:   reports current alcohol use of about 2.0 standard drinks per week. Family History:      Reviewed in detail and negative for DM, CAD, Cancer, CVA. Positive as follows:        Problem Relation Age of Onset    Diabetes Mother     Heart Disease Mother     Heart Disease Father     High Blood Pressure Sister     Diabetes Sister     Heart Disease Maternal Aunt     Cancer Paternal Uncle        REVIEW OF SYSTEMS:   Pertinent positives as noted in the HPI. All other systems reviewed and negative. PHYSICAL EXAM:    /84   Pulse 60   Temp 97.8 °F (36.6 °C) (Oral)   Resp 16   Ht 6' (1.829 m)   Wt 201 lb (91.2 kg)   SpO2 95%   BMI 27.26 kg/m²     General appearance:  No apparent distress, appears stated age and cooperative. HEENT:  Normal cephalic, atraumatic without obvious deformity. Pupils equal, round, and reactive to light. Extra ocular muscles intact. Conjunctivae/corneas clear. Neck: Supple, with full range of motion. No jugular venous distention. Trachea midline. Respiratory:  Normal respiratory effort. Clear to auscultation, bilaterally without Rales/Wheezes/Rhonchi. Cardiovascular:  Regular rate and rhythm with normal S1/S2 without murmurs, rubs or gallops. Abdomen: Soft, non-tender, non-distended with normal bowel sounds. Musculoskeletal:  No clubbing, cyanosis or edema bilaterally. Full range of motion without deformity. Skin: Skin color, texture, turgor normal.  No rashes or lesions. Neurologic:  Neurovascularly intact without any focal sensory/motor deficits.  Cranial nerves: II-XII intact, grossly non-focal.  Psychiatric:  Alert and oriented, thought content appropriate, normal insight  Capillary Refill: Brisk,< 3 seconds   Peripheral Pulses: +2 palpable, equal bilaterally       CXR:  I have reviewed the CXR with the following interpretation: no acute ASD   EKG:  I have reviewed the EKG with the following interpretation: no acute ischemic changes. Labs:     Recent Labs     07/22/22  0800   WBC 9.5   HGB 15.8   HCT 47.4        Recent Labs     07/22/22  0824      K 4.1      CO2 22   BUN 14   CREATININE 0.6*   CALCIUM 9.5     Recent Labs     07/22/22  0824   AST 14*   ALT 16   BILITOT 0.5   ALKPHOS 45     No results for input(s): INR in the last 72 hours. Recent Labs     07/22/22  0824   TROPONINI <0.01       Urinalysis:      Lab Results   Component Value Date/Time    NITRU Negative 05/09/2021 04:36 PM    BLOODU Negative 05/09/2021 04:36 PM    SPECGRAV 1.020 05/09/2021 04:36 PM    GLUCOSEU >=1000 05/09/2021 04:36 PM         Consults:    None      ASSESSMENT:    Active Hospital Problems    Diagnosis Date Noted    Chest pain [R07.9] 09/22/2017    A-fib ([facility] [address] [I48.91] 09/22/2017    COPD (chronic obstructive pulmonary disease) ([facility] [address] [J44.9] 09/22/2017    HTN (hypertension) [I10] 09/22/2017       PLAN:      Chest pain - Concerning to ED for ACS, etiology clinically unable to determine. Initial enzymes/EKG negative. Follow serial enzymes, reviewed and documented as above and monitor on tele, w/out evidence of ischemia/arrythmia. Stress test ordered and pending. HTN - w/out known CAD and no evidence of active signs/sxs of ischemia/failure. Currently controlled on home meds w/ vitals reviewed and documented as above. Afib - chronic paroxysmal of unspecified and clinically unable to determine etiology now resolved on no home meds s/p ablation. NOT Anticoagulated at baseline. Monitored on tele. COPD - w/out chronic respiratory failure on no baseline home O2. Controlled on home medication regimen - continued. DM2 - controlled on home Jardiance - held. Follow FSBS/SSI low regimen. Last HbA1c N/A.  Anticipate resuming/continuing home regimen at discharge. DVT Prophylaxis: LMWH  Diet: NPO  Code Status: Full Code      PT/OT Eval Status: Not yet ordered. Dispo - Placed in OBServation. Anticipate discharge in AM pending clinical course/further w/up and stress results. Carlos Luna present at bedside when seen on floor. Nahomi Shi MD    Thank you Karen Sims MD for the opportunity to be involved in this patient's care. If you have any questions or concerns please feel free to contact me at [phone].

## 2022-07-22 NOTE — PROGRESS NOTES
Messaged on call physician regarding pt home medications that were not added, Amlodipine 5 mg, Ezetimebe 10mg Jardiance 10mg. Pt is on a s/s for insulin elipta 1 puff.  Awaiting response     Amlodipine and zetia were added

## 2022-07-22 NOTE — ED NOTES
Son at bedside     Allegra Marrero, [address] [facility]  07/22/22 [id number]

## 2022-07-23 ENCOUNTER — APPOINTMENT (OUTPATIENT)
Dept: NUCLEAR MEDICINE | Age: 62
End: 2022-07-23
Payer: COMMERCIAL

## 2022-07-23 VITALS
HEIGHT: 72 IN | WEIGHT: 198.5 LBS | BODY MASS INDEX: 26.89 KG/M2 | TEMPERATURE: 98 F | OXYGEN SATURATION: 95 % | SYSTOLIC BLOOD PRESSURE: 129 MMHG | HEART RATE: 62 BPM | DIASTOLIC BLOOD PRESSURE: 83 MMHG | RESPIRATION RATE: 20 BRPM

## 2022-07-23 LAB
ALBUMIN SERPL-MCNC: 4 G/DL (ref 3.4–5)
ANION GAP SERPL CALCULATED.3IONS-SCNC: 10 MMOL/L (ref 3–16)
BUN BLDV-MCNC: 15 MG/DL (ref 7–20)
CALCIUM SERPL-MCNC: 9.6 MG/DL (ref 8.3–10.6)
CHLORIDE BLD-SCNC: 105 MMOL/L (ref 99–110)
CHOLESTEROL, TOTAL: 101 MG/DL (ref 0–199)
CO2: 24 MMOL/L (ref 21–32)
CREAT SERPL-MCNC: 0.7 MG/DL (ref 0.8–1.3)
EKG ATRIAL RATE: 65 BPM
EKG DIAGNOSIS: NORMAL
EKG P AXIS: 58 DEGREES
EKG P-R INTERVAL: 160 MS
EKG Q-T INTERVAL: 400 MS
EKG QRS DURATION: 98 MS
EKG QTC CALCULATION (BAZETT): 416 MS
EKG R AXIS: 43 DEGREES
EKG T AXIS: 49 DEGREES
EKG VENTRICULAR RATE: 65 BPM
ESTIMATED AVERAGE GLUCOSE: 134.1 MG/DL
GFR AFRICAN AMERICAN: >60
GFR NON-AFRICAN AMERICAN: >60
GLUCOSE BLD-MCNC: 90 MG/DL (ref 70–99)
GLUCOSE BLD-MCNC: 95 MG/DL (ref 70–99)
GLUCOSE BLD-MCNC: 98 MG/DL (ref 70–99)
HBA1C MFR BLD: 6.3 %
HCT VFR BLD CALC: 45.7 % (ref 40.5–52.5)
HDLC SERPL-MCNC: 42 MG/DL (ref 40–60)
HEMOGLOBIN: 14.9 G/DL (ref 13.5–17.5)
LDL CHOLESTEROL CALCULATED: 44 MG/DL
LV EF: 50 %
LVEF MODALITY: NORMAL
MCH RBC QN AUTO: 26.1 PG (ref 26–34)
MCHC RBC AUTO-ENTMCNC: 32.5 G/DL (ref 31–36)
MCV RBC AUTO: 80.2 FL (ref 80–100)
PDW BLD-RTO: 15.1 % (ref 12.4–15.4)
PERFORMED ON: NORMAL
PERFORMED ON: NORMAL
PHOSPHORUS: 4.2 MG/DL (ref 2.5–4.9)
PLATELET # BLD: 243 K/UL (ref 135–450)
PMV BLD AUTO: 7.5 FL (ref 5–10.5)
POTASSIUM SERPL-SCNC: 4.6 MMOL/L (ref 3.5–5.1)
RBC # BLD: 5.7 M/UL (ref 4.2–5.9)
SODIUM BLD-SCNC: 139 MMOL/L (ref 136–145)
TRIGL SERPL-MCNC: 74 MG/DL (ref 0–150)
VLDLC SERPL CALC-MCNC: 15 MG/DL
WBC # BLD: 8 K/UL (ref 4–11)

## 2022-07-23 PROCEDURE — G0378 HOSPITAL OBSERVATION PER HR: HCPCS

## 2022-07-23 PROCEDURE — 80069 RENAL FUNCTION PANEL: CPT

## 2022-07-23 PROCEDURE — 6360000002 HC RX W HCPCS: Performed by: INTERNAL MEDICINE

## 2022-07-23 PROCEDURE — 93010 ELECTROCARDIOGRAM REPORT: CPT | Performed by: INTERNAL MEDICINE

## 2022-07-23 PROCEDURE — 6370000000 HC RX 637 (ALT 250 FOR IP): Performed by: NURSE PRACTITIONER

## 2022-07-23 PROCEDURE — 94640 AIRWAY INHALATION TREATMENT: CPT

## 2022-07-23 PROCEDURE — 80061 LIPID PANEL: CPT

## 2022-07-23 PROCEDURE — 78452 HT MUSCLE IMAGE SPECT MULT: CPT

## 2022-07-23 PROCEDURE — 93017 CV STRESS TEST TRACING ONLY: CPT

## 2022-07-23 PROCEDURE — 36415 COLL VENOUS BLD VENIPUNCTURE: CPT

## 2022-07-23 PROCEDURE — 99255 IP/OBS CONSLTJ NEW/EST HI 80: CPT | Performed by: INTERNAL MEDICINE

## 2022-07-23 PROCEDURE — 3430000000 HC RX DIAGNOSTIC RADIOPHARMACEUTICAL: Performed by: INTERNAL MEDICINE

## 2022-07-23 PROCEDURE — 85027 COMPLETE CBC AUTOMATED: CPT

## 2022-07-23 PROCEDURE — 93005 ELECTROCARDIOGRAM TRACING: CPT | Performed by: INTERNAL MEDICINE

## 2022-07-23 PROCEDURE — A9502 TC99M TETROFOSMIN: HCPCS | Performed by: INTERNAL MEDICINE

## 2022-07-23 RX ADMIN — ASPIRIN 81 MG: 81 TABLET, COATED ORAL at 13:15

## 2022-07-23 RX ADMIN — REGADENOSON 0.4 MG: 0.08 INJECTION, SOLUTION INTRAVENOUS at 09:40

## 2022-07-23 RX ADMIN — TETROFOSMIN 11.2 MILLICURIE: 1.38 INJECTION, POWDER, LYOPHILIZED, FOR SOLUTION INTRAVENOUS at 08:45

## 2022-07-23 RX ADMIN — TETROFOSMIN 32.4 MILLICURIE: 1.38 INJECTION, POWDER, LYOPHILIZED, FOR SOLUTION INTRAVENOUS at 09:40

## 2022-07-23 RX ADMIN — Medication 1 PUFF: at 07:57

## 2022-07-23 ASSESSMENT — PAIN SCALES - GENERAL
PAINLEVEL_OUTOF10: 0
PAINLEVEL_OUTOF10: 0

## 2022-07-23 NOTE — DISCHARGE SUMMARY
Hospital Medicine Discharge Summary    Patient ID: Daniel Lim      Patient's PCP: Roberto Paulino MD    Admit Date: 7/22/2022     Discharge Date: 7/23/2022      Admitting Provider: Lynda Loyd MD     Discharge Provider: Marina Horvath MD     Discharge Diagnoses: Active Hospital Problems    Diagnosis     Chest pain [R07.9]     A-fib (HCC) [I48.91]     COPD (chronic obstructive pulmonary disease) (HCC) [J44.9]     HTN (hypertension) [I10]        The patient was seen and examined on day of discharge and this discharge summary is in conjunction with any daily progress note from day of discharge. Hospital Course: This is a 68-year-old male admitted with chest pain cardiac enzymes negative stress test today-  Low normal LVEF 50%    Grossly Normal wall motion    Difficult to evaluate inferior wall due to extracardiac uptake    No significant areas of reversibility noted to suggest ischemia, however,    would rec: stress echo to further assess as this would be considered a    technically difficult study   Cardiology consulted, patient was seen evaluated by cardiology cardiology consult appreciated. Patient would like to be discharged home today and knows the risk and he will follow-up with his own cardiologist stated that he will call cardiology on Monday morning to schedule for further testing. 2.  Hyperlipidemia continue with current medication. 3.  Diabetes mellitus type 2 continue with home medication hemoglobin A1c 6.3 on 7/22/2022.  4.  History of atrial fibrillation status post ablation 2016 on the monitor patient in and out of atrial fibrillation rate is controlled. 5.  Hypertension controlled continue with home medication.           Physical Exam Performed:     /83   Pulse 62   Temp 98 °F (36.7 °C) (Oral)   Resp 20   Ht 6' (1.829 m)   Wt 198 lb 8 oz (90 kg)   SpO2 95%   BMI 26.92 kg/m²       General appearance:  No apparent distress, appears stated age and cooperative. HEENT:  Normal cephalic, atraumatic without obvious deformity. Pupils equal, round, and reactive to light. Extra ocular muscles intact. Conjunctivae/corneas clear. Neck: Supple, with full range of motion. No jugular venous distention. Trachea midline. Respiratory:  Normal respiratory effort. Clear to auscultation, bilaterally without Rales/Wheezes/Rhonchi. Cardiovascular:  Regular rate and rhythm with normal S1/S2 without murmurs, rubs or gallops. Abdomen: Soft, non-tender, non-distended with normal bowel sounds. Musculoskeletal:  No clubbing, cyanosis or edema bilaterally. Full range of motion without deformity. Skin: Skin color, texture, turgor normal.  No rashes or lesions. Neurologic:  Neurovascularly intact without any focal sensory/motor deficits. Cranial nerves: II-XII intact, grossly non-focal.  Psychiatric:  Alert and oriented, thought content appropriate, normal insight  Capillary Refill: Brisk,< 3 seconds   Peripheral Pulses: +2 palpable, equal bilaterally       Labs:  For convenience and continuity at follow-up the following most recent labs are provided:      CBC:    Lab Results   Component Value Date/Time    WBC 8.0 07/23/2022 06:24 AM    HGB 14.9 07/23/2022 06:24 AM    HCT 45.7 07/23/2022 06:24 AM     07/23/2022 06:24 AM       Renal:    Lab Results   Component Value Date/Time     07/23/2022 06:24 AM    K 4.6 07/23/2022 06:24 AM    K 4.1 07/22/2022 08:24 AM     07/23/2022 06:24 AM    CO2 24 07/23/2022 06:24 AM    BUN 15 07/23/2022 06:24 AM    CREATININE 0.7 07/23/2022 06:24 AM    CALCIUM 9.6 07/23/2022 06:24 AM    PHOS 4.2 07/23/2022 06:24 AM         Significant Diagnostic Studies    Radiology:   NM Cardiac Stress Test Nuclear Imaging   Final Result      XR CHEST PORTABLE   Final Result   Unremarkable portable exam                Consults:     IP CONSULT TO CARDIOLOGY    Disposition: Home    Condition at Discharge: Stable    Discharge Instructions/Follow-up: Patient to follow-up with his cardiologist on Monday    Code Status: Full code    Activity: activity as tolerated    Diet: cardiac diet      Discharge Medications:     Discharge Medication List as of 7/23/2022  1:55 PM             Details   Empagliflozin (JARDIANCE PO) Take by mouthHistorical Med      ezetimibe (ZETIA) 10 MG tablet Take 10 mg by mouth in the morning. Historical Med      aspirin 81 MG EC tablet Take 81 mg by mouthHistorical Med      atorvastatin (LIPITOR) 40 MG tablet Take 40 mg by mouth dailyHistorical Med      amLODIPine (NORVASC) 5 MG tablet Take 5 mg by mouth dailyHistorical Med      ALBUTEROL IN Inhale  into the lungs. Historical Med             Time Spent on discharge is more than 45 minutes in the examination, evaluation, counseling and review of medications and discharge plan. Signed:    Mohit Styles MD   7/23/2022      Thank you Sol Davis MD for the opportunity to be involved in this patient's care. If you have any questions or concerns, please feel free to contact me at [phone].

## 2022-07-23 NOTE — PLAN OF CARE
Problem: Pain  Goal: Verbalizes/displays adequate comfort level or baseline comfort level  Outcome: Progressing  Flowsheets (Taken 7/22/2022 1839)  Verbalizes/displays adequate comfort level or baseline comfort level:   Encourage patient to monitor pain and request assistance   Assess pain using appropriate pain scale   Administer analgesics based on type and severity of pain and evaluate response   Implement non-pharmacological measures as appropriate and evaluate response

## 2022-07-23 NOTE — CONSULTS
Consult edinson Fisher  Date:7/23/2022,  Time:12:49 PM        Electronically signed by Karina Loo on 7/23/2022 at 12:49 PM

## 2022-07-23 NOTE — PROGRESS NOTES
Discharged with documented belongings. IV removed without complication. Tele removed and returned. Patient verbalizes understanding of follow-up with [facility]iology Monday. Denies further needs at this time.

## 2022-07-23 NOTE — CONSULTS
(10/05/11); Cataract removal with implant (10/2011); ablation of dysrhythmic focus; and shoulder surgery. Social History:   reports that he has been smoking cigarettes. He has a 68.00 pack-year smoking history. He has never used smokeless tobacco. He reports current alcohol use of about 2.0 standard drinks per week. He reports that he does not use drugs. Family History:  family history includes Cancer in his paternal uncle; Diabetes in his mother and sister; Heart Disease in his father, maternal aunt, and mother; High Blood Pressure in his sister. Home Medications:  Were reviewed and are listed in nursing record and/or below  Prior to Admission medications    Medication Sig Start Date End Date Taking? Authorizing Provider   Empagliflozin (JARDIANCE PO) Take by mouth   Yes Historical Provider, MD   ezetimibe (ZETIA) 10 MG tablet Take 10 mg by mouth in the morning. Yes Historical Provider, MD   aspirin 81 MG EC tablet Take 81 mg by mouth 6/12/18  Yes Historical Provider, MD   atorvastatin (LIPITOR) 40 MG tablet Take 40 mg by mouth daily   Yes Historical Provider, MD   amLODIPine (NORVASC) 5 MG tablet Take 5 mg by mouth daily   Yes Historical Provider, MD   ALBUTEROL IN Inhale  into the lungs.    Yes Historical Provider, MD        CURRENT Medications:  sodium chloride flush 0.9 % injection 5-40 mL, 2 times per day  sodium chloride flush 0.9 % injection 5-40 mL, PRN  0.9 % sodium chloride infusion, PRN  ondansetron (ZOFRAN-ODT) disintegrating tablet 4 mg, Q8H PRN   Or  ondansetron (ZOFRAN) injection 4 mg, Q6H PRN  acetaminophen (TYLENOL) tablet 650 mg, Q6H PRN   Or  acetaminophen (TYLENOL) suppository 650 mg, Q6H PRN  polyethylene glycol (GLYCOLAX) packet 17 g, Daily PRN  atorvastatin (LIPITOR) tablet 40 mg, Nightly  nitroGLYCERIN (NITROSTAT) SL tablet 0.4 mg, Q5 Min PRN  enoxaparin (LOVENOX) injection 40 mg, Q24H  glucose chewable tablet 16 g, PRN  dextrose bolus 10% 125 mL, PRN   Or  dextrose bolus 10% 250 mL, PRN  glucagon (rDNA) injection 1 mg, PRN  dextrose 10 % infusion, Continuous PRN  insulin lispro (HUMALOG) injection vial 0-4 Units, TID WC  insulin lispro (HUMALOG) injection vial 0-4 Units, Nightly  amLODIPine (NORVASC) tablet 5 mg, Nightly  ezetimibe (ZETIA) tablet 10 mg, Nightly  aspirin EC tablet 81 mg, Daily  mometasone-formoterol (DULERA) 200-5 MCG/ACT inhaler 1 puff, BID        Allergies:  Bee venom and Codeine     Review of Systems:   A 14 point review of symptoms completed. Pertinent positives identified in the HPI, all other review of symptoms negative as below.       Objective   PHYSICAL EXAM:    Vitals:    07/23/22 1100   BP: 129/83   Pulse: 62   Resp: 20   Temp: 98 °F (36.7 °C)   SpO2: 95%    Weight: 198 lb 8 oz (90 kg)         General Appearance:  Alert, cooperative, no distress, appears stated age   Head:  Normocephalic, without obvious abnormality, atraumatic   Eyes:  PERRL, conjunctiva/corneas clear   Nose: Nares normal, no drainage or sinus tenderness   Throat: Lips, mucosa, and tongue normal   Neck: Supple, symmetrical, trachea midline, no adenopathy, thyroid: not enlarged, symmetric, no tenderness/mass/nodules, no carotid bruit or JVD   Lungs:   Clear to auscultation bilaterally, respirations unlabored   Chest Wall:  No deformity or tenderness   Heart:  Regular rate and rhythm, S1, S2 normal, no murmur, rub or gallop   Abdomen:   Soft, non-tender, bowel sounds active all four quadrants,  no masses, no organomegaly   Extremities: Extremities normal, atraumatic, no cyanosis or edema   Pulses: 2+ and symmetric   Skin: Skin color, texture, turgor normal, no rashes or lesions   Pysch: Normal mood and affect   Neurologic: Normal gross motor and sensory exam.         Labs   CBC:   Lab Results   Component Value Date/Time    WBC 8.0 07/23/2022 06:24 AM    RBC 5.70 07/23/2022 06:24 AM    HGB 14.9 07/23/2022 06:24 AM    HCT 45.7 07/23/2022 06:24 AM    MCV 80.2 07/23/2022 06:24 AM    RDW 15.1 07/23/2022 06:24 AM     07/23/2022 06:24 AM     CMP:  Lab Results   Component Value Date/Time     07/23/2022 06:24 AM    K 4.6 07/23/2022 06:24 AM    K 4.1 07/22/2022 08:24 AM     07/23/2022 06:24 AM    CO2 24 07/23/2022 06:24 AM    BUN 15 07/23/2022 06:24 AM    CREATININE 0.7 07/23/2022 06:24 AM    GFRAA >60 07/23/2022 06:24 AM    GFRAA >60 03/18/2013 11:37 AM    AGRATIO 1.4 07/22/2022 08:24 AM    LABGLOM >60 07/23/2022 06:24 AM    GLUCOSE 98 07/23/2022 06:24 AM    PROT 7.0 07/22/2022 08:24 AM    PROT 6.9 03/18/2013 11:37 AM    CALCIUM 9.6 07/23/2022 06:24 AM    BILITOT 0.5 07/22/2022 08:24 AM    ALKPHOS 45 07/22/2022 08:24 AM    AST 14 07/22/2022 08:24 AM    ALT 16 07/22/2022 08:24 AM     PT/INR:  No results found for: PTINR  HgBA1c:  Lab Results   Component Value Date    LABA1C 6.3 07/22/2022     Lab Results   Component Value Date    TROPONINI <0.01 07/22/2022         Cardiac Data     Last EKG: Normal sinus rhythm, early repolarization    Echo:    2020      Left ventricle:     - The cavity size is normal. Wall thickness is normal. Systolic     function was normal. The estimated ejection fraction was 59%. Aortic valve:     - The valve was structurally normal. Cusp separation was normal.     Doppler:     - Transvalvular velocity is within the normal range. There is no     stenosis. There was trivial regurgitation.     - The ratio of LVOT to aortic valve peak velocity is 0.86. AORTA:  Aortic root: The aortic root is not dilated. Mitral valve:     - The valve is structurally normal. Leaflet separation was normal.     Doppler:     - Transvalvular velocity is within the normal range. There is no     evidence for stenosis. There was no significant regurgitation.     - The valve area by pressure half-time is 3.1cm^2. The valve area     index by pressure half-time is 1.39cm^2/m^2. Left atrium:     - The atrium is normal in size. Right ventricle:     - The cavity size is mildly dilated. Wall thickness is normal.     Systolic function is normal.     Pulmonic valve:     - The valve was structurally normal.     Tricuspid valve:     - The valve was structurally normal. Leaflet separation was normal.     Doppler:     - Transvalvular velocity is within the normal range. There is no     evidence for stenosis. There was no significant regurgitation. Pulmonary artery:     - Not well visualized. Right atrium:     - The atrium is normal in size. Pericardium:     - The pericardium was normal in appearance. There is no pericardial     effusion. Systemic veins:   Inferior vena cava:     - The vessel was normal in size; the respirophasic diameter changes     were in the normal range (&gt;= 50%); findings are consistent with     normal central venous pressure. - The internal diameter is 1.4cm. - The proximal internal diameter during inspiration is 0.7cm. Stress Test:    Summary    Low normal LVEF 50%    Grossly Normal wall motion    Difficult to evaluate inferior wall due to extracardiac uptake    No significant areas of reversibility noted to suggest ischemia, however,    would rec: stress echo to further assess as this would be considered a    technically difficult study       Cath:    Studies:     Cxr       Impression   Unremarkable portable exam           I have reviewed labs and imaging/xray/diagnostic testing in this note. Assessment and Plan          Patient Active Problem List   Diagnosis    Chest pain    A-fib (HCC)    HTN (hypertension)    COPD (chronic obstructive pulmonary disease) (HCC)       Chest pain, equivocal stress test, recommended follow-up ischemia evaluation such a stress echo, CT calcium score and/for CT coronary angiography versus heart catheterization, patient would like to defer such testing at this time as he has establish care with [facility] cardiology and would like to follow-up there on Monday to determine next steps.   We discussed options for staying in the hospital versus outpatient evaluation and given overall equivocal results, it would not be unreasonable to pursue the testing as an outpatient although inpatient testing would be preferable as there are risks of disease progression/complications/death such as heart attack. Having heard risk/benefits/alternative/outcomes, patient would like to be discharged and will try to facilitate that. He will contact his primary cardiologist on Monday and he will let us know if there are any other needs/concerns. CAD/ASHD mild per prior reports, continue aspirin    PAD/AAA, outpatient monitoring    Hypercholesteremia, continue statin/Zetia    Hypertension, continue amlodipine    Diabetes, as per primary service    History of syncope, as per primary cardiologist at [place] [id number] abuse, encouraged smoking cessation    Discussed with bedside nursing and hospitalist service, patient will be discharged today with plans for outpatient follow-up with his primary cardiac team at [facility].  We will sign off, please call with questions. Thank you for allowing us to participate in the care of [address]. Please call me with any questions [phone].     Nataliia Edwards MD, [facility]   Interventional Cardiologist  [facility]  [phone] [facility]  [phone] [address] [place] [address]  7/23/2022 1:19 PM

## 2022-07-23 NOTE — PROGRESS NOTES
A Lexiscan myoview stress test was completed on this patient as ordered. The patient tolerated the procedure well.

## 2022-07-23 NOTE — PROGRESS NOTES
Hospitalist Progress Note      PCP: Roberto Paulino MD    Date of Admission: 7/22/2022    Chief Complaint: Chest Pain    Hospital Course: 78-year-old male with a history of hypertension, hyperlipidemia, atrial fibrillation status post ablation 2016 at [facility] admitted with chest pain cardiac enzymes x3 negative. Subjective: Patient denies any chest pain or shortness of breath no nausea vomiting anxious to be discharged home stated that he has cardiologist at [facility] Dr. Deven Jordan      Medications:  Reviewed    Infusion Medications    sodium chloride      dextrose       Scheduled Medications    sodium chloride flush  5-40 mL IntraVENous 2 times per day    atorvastatin  40 mg Oral Nightly    enoxaparin  40 mg SubCUTAneous Q24H    insulin lispro  0-4 Units SubCUTAneous TID WC    insulin lispro  0-4 Units SubCUTAneous Nightly    amLODIPine  5 mg Oral Nightly    ezetimibe  10 mg Oral Nightly    aspirin  81 mg Oral Daily    mometasone-formoterol  1 puff Inhalation BID     PRN Meds: sodium chloride flush, sodium chloride, ondansetron **OR** ondansetron, acetaminophen **OR** acetaminophen, polyethylene glycol, nitroGLYCERIN, regadenoson, glucose, dextrose bolus **OR** dextrose bolus, glucagon (rDNA), dextrose    No intake or output data in the 24 hours ending 07/23/22 0933    Physical Exam Performed:    /74   Pulse 56   Temp 97.8 °F (36.6 °C) (Oral)   Resp 18   Ht 6' (1.829 m)   Wt 198 lb 8 oz (90 kg)   SpO2 93%   BMI 26.92 kg/m²     General appearance: No apparent distress, appears stated age and cooperative. HEENT: Pupils equal, round, and reactive to light. Conjunctivae/corneas clear. Neck: Supple, with full range of motion. No jugular venous distention. Trachea midline. Respiratory:  Normal respiratory effort. Clear to auscultation, bilaterally without Rales/Wheezes/Rhonchi. Cardiovascular: Regular rate and rhythm with normal S1/S2 without murmurs, rubs or gallops.   Abdomen: Soft, non-tender, non-distended with normal bowel sounds. Musculoskeletal: No clubbing, cyanosis or edema bilaterally. Full range of motion without deformity. Skin: Skin color, texture, turgor normal.  No rashes or lesions. Neurologic:  Neurovascularly intact without any focal sensory/motor deficits. Cranial nerves: II-XII intact, grossly non-focal.  Psychiatric: Alert and oriented, thought content appropriate, normal insight  Capillary Refill: Brisk,3 seconds, normal   Peripheral Pulses: +2 palpable, equal bilaterally       Labs:   Recent Labs     07/22/22  0800 07/23/22  0624   WBC 9.5 8.0   HGB 15.8 14.9   HCT 47.4 45.7    243     Recent Labs     07/22/22  0824 07/23/22 0624    139   K 4.1 4.6    105   CO2 22 24   BUN 14 15   CREATININE 0.6* 0.7*   CALCIUM 9.5 9.6   PHOS  --  4.2     Recent Labs     07/22/22 0824   AST 14*   ALT 16   BILITOT 0.5   ALKPHOS 45     No results for input(s): INR in the last 72 hours.   Recent Labs     07/22/22  1126 07/22/22  1337 07/22/22  1643   TROPONINI <0.01 <0.01 <0.01       Urinalysis:      Lab Results   Component Value Date/Time    NITRU Negative 05/09/2021 04:36 PM    BLOODU Negative 05/09/2021 04:36 PM    SPECGRAV 1.020 05/09/2021 04:36 PM    GLUCOSEU >=1000 05/09/2021 04:36 PM       Radiology:  XR CHEST PORTABLE   Final Result   Unremarkable portable exam         NM Cardiac Stress Test Nuclear Imaging    (Results Pending)           Assessment/Plan:    Active Hospital Problems    Diagnosis     Chest pain [R07.9]     A-fib ([facility]) [I48.91]     COPD (chronic obstructive pulmonary disease) ([facility]) [J44.9]     HTN (hypertension) [I10]      This is a 68-year-old male admitted with chest pain cardiac enzymes negative stress test today-  Low normal LVEF 50%    Grossly Normal wall motion    Difficult to evaluate inferior wall due to extracardiac uptake    No significant areas of reversibility noted to suggest ischemia, however,    would rec: stress echo to further assess as this would be considered a    technically difficult study   Cardiology consulted, patient was seen evaluated by cardiology cardiology consult appreciated. Patient would like to be discharged home today and knows the risk and he will follow-up with his own cardiologist stated that he will call cardiology on Monday morning to schedule for further testing. 2.  Hyperlipidemia continue with current medication. 3.  Diabetes mellitus type 2 continue with home medication hemoglobin A1c 6.3 on 7/22/2022.  4.  History of atrial fibrillation status post ablation 2016 on the monitor patient in and out of atrial fibrillation rate is controlled. 5.  Hypertension controlled continue with home medication.     DVT Prophylaxis: Lovenox subcu  Diet: Diet NPO Exceptions are: Sips of Water with Meds  Code Status: Full Code    PT/OT Eval Status:     Lashae Londono MD